# Patient Record
Sex: FEMALE | Race: WHITE | NOT HISPANIC OR LATINO | Employment: FULL TIME | URBAN - METROPOLITAN AREA
[De-identification: names, ages, dates, MRNs, and addresses within clinical notes are randomized per-mention and may not be internally consistent; named-entity substitution may affect disease eponyms.]

---

## 2019-08-22 ENCOUNTER — HOSPITAL ENCOUNTER (EMERGENCY)
Facility: HOSPITAL | Age: 64
Discharge: HOME | End: 2019-08-22
Attending: EMERGENCY MEDICINE
Payer: COMMERCIAL

## 2019-08-22 VITALS
RESPIRATION RATE: 18 BRPM | SYSTOLIC BLOOD PRESSURE: 146 MMHG | BODY MASS INDEX: 33.73 KG/M2 | HEIGHT: 66 IN | DIASTOLIC BLOOD PRESSURE: 79 MMHG | OXYGEN SATURATION: 98 % | HEART RATE: 86 BPM | TEMPERATURE: 98 F

## 2019-08-22 DIAGNOSIS — N12 PYELONEPHRITIS: Primary | ICD-10-CM

## 2019-08-22 LAB
ALBUMIN SERPL BCP-MCNC: 4 G/DL (ref 3.5–5.2)
ALP SERPL-CCNC: 99 U/L (ref 55–135)
ALT SERPL W/O P-5'-P-CCNC: 16 U/L (ref 10–44)
ANION GAP SERPL CALC-SCNC: 13 MMOL/L (ref 8–16)
AST SERPL-CCNC: 18 U/L (ref 10–40)
BACTERIA #/AREA URNS HPF: ABNORMAL /HPF
BASOPHILS # BLD AUTO: 0.02 K/UL (ref 0–0.2)
BASOPHILS NFR BLD: 0.2 % (ref 0–1.9)
BILIRUB SERPL-MCNC: 0.7 MG/DL (ref 0.1–1)
BILIRUB UR QL STRIP: NEGATIVE
BUN SERPL-MCNC: 16 MG/DL (ref 8–23)
CALCIUM SERPL-MCNC: 9.7 MG/DL (ref 8.7–10.5)
CHLORIDE SERPL-SCNC: 106 MMOL/L (ref 95–110)
CLARITY UR: CLEAR
CO2 SERPL-SCNC: 21 MMOL/L (ref 23–29)
COLOR UR: YELLOW
CREAT SERPL-MCNC: 0.8 MG/DL (ref 0.5–1.4)
DIFFERENTIAL METHOD: ABNORMAL
EOSINOPHIL # BLD AUTO: 0 K/UL (ref 0–0.5)
EOSINOPHIL NFR BLD: 0.3 % (ref 0–8)
ERYTHROCYTE [DISTWIDTH] IN BLOOD BY AUTOMATED COUNT: 12.6 % (ref 11.5–14.5)
EST. GFR  (AFRICAN AMERICAN): >60 ML/MIN/1.73 M^2
EST. GFR  (NON AFRICAN AMERICAN): >60 ML/MIN/1.73 M^2
GLUCOSE SERPL-MCNC: 98 MG/DL (ref 70–110)
GLUCOSE UR QL STRIP: NEGATIVE
HCT VFR BLD AUTO: 39.5 % (ref 37–48.5)
HGB BLD-MCNC: 13.5 G/DL (ref 12–16)
HGB UR QL STRIP: NEGATIVE
KETONES UR QL STRIP: NEGATIVE
LEUKOCYTE ESTERASE UR QL STRIP: ABNORMAL
LYMPHOCYTES # BLD AUTO: 1 K/UL (ref 1–4.8)
LYMPHOCYTES NFR BLD: 8.2 % (ref 18–48)
MCH RBC QN AUTO: 31.3 PG (ref 27–31)
MCHC RBC AUTO-ENTMCNC: 34.2 G/DL (ref 32–36)
MCV RBC AUTO: 91 FL (ref 82–98)
MICROSCOPIC COMMENT: ABNORMAL
MONOCYTES # BLD AUTO: 1.1 K/UL (ref 0.3–1)
MONOCYTES NFR BLD: 8.7 % (ref 4–15)
NEUTROPHILS # BLD AUTO: 10.4 K/UL (ref 1.8–7.7)
NEUTROPHILS NFR BLD: 82.9 % (ref 38–73)
NITRITE UR QL STRIP: POSITIVE
PH UR STRIP: 8 [PH] (ref 5–8)
PLATELET # BLD AUTO: 225 K/UL (ref 150–350)
PMV BLD AUTO: 10.8 FL (ref 9.2–12.9)
POTASSIUM SERPL-SCNC: 3.9 MMOL/L (ref 3.5–5.1)
PROT SERPL-MCNC: 7.8 G/DL (ref 6–8.4)
PROT UR QL STRIP: NEGATIVE
RBC # BLD AUTO: 4.32 M/UL (ref 4–5.4)
SODIUM SERPL-SCNC: 140 MMOL/L (ref 136–145)
SP GR UR STRIP: 1.01 (ref 1–1.03)
URN SPEC COLLECT METH UR: ABNORMAL
UROBILINOGEN UR STRIP-ACNC: NEGATIVE EU/DL
WBC # BLD AUTO: 12.54 K/UL (ref 3.9–12.7)
WBC #/AREA URNS HPF: 30 /HPF (ref 0–5)

## 2019-08-22 PROCEDURE — 99285 EMERGENCY DEPT VISIT HI MDM: CPT | Mod: 25

## 2019-08-22 PROCEDURE — 87077 CULTURE AEROBIC IDENTIFY: CPT

## 2019-08-22 PROCEDURE — 80053 COMPREHEN METABOLIC PANEL: CPT

## 2019-08-22 PROCEDURE — 96365 THER/PROPH/DIAG IV INF INIT: CPT

## 2019-08-22 PROCEDURE — 87186 SC STD MICRODIL/AGAR DIL: CPT

## 2019-08-22 PROCEDURE — 63600175 PHARM REV CODE 636 W HCPCS: Performed by: EMERGENCY MEDICINE

## 2019-08-22 PROCEDURE — 87086 URINE CULTURE/COLONY COUNT: CPT

## 2019-08-22 PROCEDURE — 85025 COMPLETE CBC W/AUTO DIFF WBC: CPT

## 2019-08-22 PROCEDURE — 87088 URINE BACTERIA CULTURE: CPT

## 2019-08-22 PROCEDURE — 25500020 PHARM REV CODE 255: Performed by: EMERGENCY MEDICINE

## 2019-08-22 PROCEDURE — 81000 URINALYSIS NONAUTO W/SCOPE: CPT

## 2019-08-22 RX ORDER — CEPHALEXIN 500 MG/1
500 CAPSULE ORAL 4 TIMES DAILY
Qty: 28 CAPSULE | Refills: 0 | Status: SHIPPED | OUTPATIENT
Start: 2019-08-22 | End: 2019-08-29

## 2019-08-22 RX ORDER — HYDROCODONE BITARTRATE AND ACETAMINOPHEN 5; 325 MG/1; MG/1
1 TABLET ORAL EVERY 6 HOURS PRN
Qty: 9 TABLET | Refills: 0 | Status: SHIPPED | OUTPATIENT
Start: 2019-08-22 | End: 2020-02-19

## 2019-08-22 RX ADMIN — CEFTRIAXONE 1 G: 1 INJECTION, SOLUTION INTRAVENOUS at 04:08

## 2019-08-22 RX ADMIN — IOHEXOL 100 ML: 350 INJECTION, SOLUTION INTRAVENOUS at 03:08

## 2019-08-22 NOTE — DISCHARGE INSTRUCTIONS
Keflex as prescribed for infection.  Ibuprofen for pain.  Norco for breakthrough pain.  Follow up with her doctor tomorrow for re-evaluation.  Return as needed for any worsening symptoms, problems, questions or concerns.

## 2019-08-22 NOTE — ED PROVIDER NOTES
SCRIBE #1 NOTE: I, Corinne Mack, am scribing for, and in the presence of, Yuri Templeton MD. I have scribed the HPI, ROS, and PEx.     SCRIBE #2 NOTE: I, Jackelyn Brewer am scribing for, and in the presence of,  Marcelo Torre Jr., MD. I have scribed the remaining portions of the note not scribed by Scribe #1.     History      Chief Complaint   Patient presents with    Abdominal Pain     Pt c/o of LLQ abdominal pain and cramping xs 1 day.        Review of patient's allergies indicates:   Allergen Reactions    No known allergies         HPI   HPI    8/22/2019, 1:45 PM   History obtained from the patient      History of Present Illness: Lauren Ballard is a 63 y.o. female patient with PMHx of anxiety and fibromyalgia who presents to the Emergency Department for shooting L lower abd pain that radiates to the navel which onset gradually this morning. Symptoms are intermittent and moderate in severity. No mitigating or exacerbating factors reported. Associated sxs include hot flashes, diaphoresis, chills, and SOB. Patient denies any fever, CP, N/V/D, back pain, neck pain, HA, dizziness, and all other sxs at this time. No prior Tx reported. No further complaints or concerns at this time.         Arrival mode: Personal vehicle    PCP: Fausto Ely MD       Past Medical History:  Past Medical History:   Diagnosis Date    Acid reflux     Allergy     Anxiety     Fibromyalgia 2003    Fibromyalgia     Joint pain        Past Surgical History:  Past Surgical History:   Procedure Laterality Date    ADENOIDECTOMY      HYSTERECTOMY      NASAL SEPTUM SURGERY      TONSILLECTOMY           Family History:  Family History   Problem Relation Age of Onset    Diabetes Mother     Rheum arthritis Mother     Heart failure Father     Rashes / Skin problems Brother     Factor V Leiden deficiency Brother     Melanoma Brother     Hypertension Maternal Grandfather     Rheum arthritis Sister     Fibromyalgia Sister      Factor V Leiden deficiency Sister     Fibromyalgia Sister     Thyroid disease Neg Hx     Stroke Neg Hx     Lupus Neg Hx     Psoriasis Neg Hx     Eczema Neg Hx        Social History:  Social History     Tobacco Use    Smoking status: Never Smoker    Smokeless tobacco: Never Used   Substance and Sexual Activity    Alcohol use: Yes     Alcohol/week: 0.6 - 2.4 oz     Types: 1 - 4 Glasses of wine per week     Frequency: Never     Comment: socially    Drug use: No    Sexual activity: Never       ROS   Review of Systems   Constitutional: Positive for chills and diaphoresis. Negative for fever.        (+) hot flashes   Respiratory: Positive for shortness of breath. Negative for cough.    Cardiovascular: Negative for chest pain and leg swelling.   Gastrointestinal: Positive for abdominal pain. Negative for diarrhea, nausea and vomiting.   Musculoskeletal: Negative for back pain, neck pain and neck stiffness.   Skin: Negative for rash and wound.   Neurological: Negative for dizziness, light-headedness, numbness and headaches.   All other systems reviewed and are negative.    Physical Exam      Initial Vitals [08/22/19 1303]   BP Pulse Resp Temp SpO2   (!) 158/87 94 16 98.3 °F (36.8 °C) 97 %      MAP       --          Physical Exam  Nursing Notes and Vital Signs Reviewed.  Constitutional: Patient is in no acute distress. Well-developed and well-nourished.  Head: Atraumatic. Normocephalic.  Eyes: PERRL. EOM intact. Conjunctivae are not pale. No scleral icterus.  ENT: Mucous membranes are moist. Oropharynx is clear and symmetric.    Neck: Supple. Full ROM. No lymphadenopathy.  Cardiovascular: Regular rate. Regular rhythm. No murmurs, rubs, or gallops. Distal pulses are 2+ and symmetric.  Pulmonary/Chest: No respiratory distress. Clear to auscultation bilaterally. No wheezing or rales.  Abdominal: Soft and non-distended.  There is mild LLQ tenderness.  No rebound, guarding, or rigidity. Good bowel  "sounds.  Musculoskeletal: Moves all extremities. No obvious deformities. No edema.   Skin: Warm and dry.  Neurological:  Alert, awake, and appropriate.  Normal speech.  No acute focal neurological deficits are appreciated.  Psychiatric: Anxious. Good eye contact. Appropriate in content.    ED Course    Procedures  ED Vital Signs:  Vitals:    08/22/19 1303   BP: (!) 158/87   Pulse: 94   Resp: 16   Temp: 98.3 °F (36.8 °C)   TempSrc: Oral   SpO2: 97%   Height: 5' 6" (1.676 m)       Abnormal Lab Results:  Labs Reviewed   CBC W/ AUTO DIFFERENTIAL - Abnormal; Notable for the following components:       Result Value    Mean Corpuscular Hemoglobin 31.3 (*)     Gran # (ANC) 10.4 (*)     Mono # 1.1 (*)     Gran% 82.9 (*)     Lymph% 8.2 (*)     All other components within normal limits   COMPREHENSIVE METABOLIC PANEL - Abnormal; Notable for the following components:    CO2 21 (*)     All other components within normal limits   URINALYSIS, REFLEX TO URINE CULTURE - Abnormal; Notable for the following components:    Nitrite, UA Positive (*)     Leukocytes, UA 2+ (*)     All other components within normal limits    Narrative:     In and Out Cath as needed it patient unable to void  Preferred Collection Type->Urine, Clean Catch   URINALYSIS MICROSCOPIC - Abnormal; Notable for the following components:    WBC, UA 30 (*)     Bacteria Many (*)     All other components within normal limits    Narrative:     In and Out Cath as needed it patient unable to void  Preferred Collection Type->Urine, Clean Catch   CULTURE, URINE        All Lab Results:  Results for orders placed or performed during the hospital encounter of 08/22/19   CBC auto differential   Result Value Ref Range    WBC 12.54 3.90 - 12.70 K/uL    RBC 4.32 4.00 - 5.40 M/uL    Hemoglobin 13.5 12.0 - 16.0 g/dL    Hematocrit 39.5 37.0 - 48.5 %    Mean Corpuscular Volume 91 82 - 98 fL    Mean Corpuscular Hemoglobin 31.3 (H) 27.0 - 31.0 pg    Mean Corpuscular Hemoglobin Conc 34.2 " 32.0 - 36.0 g/dL    RDW 12.6 11.5 - 14.5 %    Platelets 225 150 - 350 K/uL    MPV 10.8 9.2 - 12.9 fL    Gran # (ANC) 10.4 (H) 1.8 - 7.7 K/uL    Lymph # 1.0 1.0 - 4.8 K/uL    Mono # 1.1 (H) 0.3 - 1.0 K/uL    Eos # 0.0 0.0 - 0.5 K/uL    Baso # 0.02 0.00 - 0.20 K/uL    Gran% 82.9 (H) 38.0 - 73.0 %    Lymph% 8.2 (L) 18.0 - 48.0 %    Mono% 8.7 4.0 - 15.0 %    Eosinophil% 0.3 0.0 - 8.0 %    Basophil% 0.2 0.0 - 1.9 %    Differential Method Automated    Comprehensive metabolic panel   Result Value Ref Range    Sodium 140 136 - 145 mmol/L    Potassium 3.9 3.5 - 5.1 mmol/L    Chloride 106 95 - 110 mmol/L    CO2 21 (L) 23 - 29 mmol/L    Glucose 98 70 - 110 mg/dL    BUN, Bld 16 8 - 23 mg/dL    Creatinine 0.8 0.5 - 1.4 mg/dL    Calcium 9.7 8.7 - 10.5 mg/dL    Total Protein 7.8 6.0 - 8.4 g/dL    Albumin 4.0 3.5 - 5.2 g/dL    Total Bilirubin 0.7 0.1 - 1.0 mg/dL    Alkaline Phosphatase 99 55 - 135 U/L    AST 18 10 - 40 U/L    ALT 16 10 - 44 U/L    Anion Gap 13 8 - 16 mmol/L    eGFR if African American >60 >60 mL/min/1.73 m^2    eGFR if non African American >60 >60 mL/min/1.73 m^2   Urinalysis, Reflex to Urine Culture Urine, Clean Catch   Result Value Ref Range    Specimen UA Urine, Clean Catch     Color, UA Yellow Yellow, Straw, Elenita    Appearance, UA Clear Clear    pH, UA 8.0 5.0 - 8.0    Specific Gravity, UA 1.010 1.005 - 1.030    Protein, UA Negative Negative    Glucose, UA Negative Negative    Ketones, UA Negative Negative    Bilirubin (UA) Negative Negative    Occult Blood UA Negative Negative    Nitrite, UA Positive (A) Negative    Urobilinogen, UA Negative <2.0 EU/dL    Leukocytes, UA 2+ (A) Negative   Urinalysis Microscopic   Result Value Ref Range    WBC, UA 30 (H) 0 - 5 /hpf    Bacteria Many (A) None-Occ /hpf    Microscopic Comment SEE COMMENT        Imaging Results:  Imaging Results          CT Abdomen Pelvis With Contrast (Final result)  Result time 08/22/19 16:23:43    Final result by Shane Malave III, MD  (08/22/19 16:23:43)                 Impression:      1.  Patient has had an interval cholecystectomy.    2.  Suspect an element of fatty change in the liver.    3.  Diverticulosis without definite evidence of diverticulitis.    4.  Mildly prominent left renal collecting system with prominence of the left ureter and mild enhancement along the wall.  Infection?  Stone not totally excluded, see discussion above.  Follow-up recommended.    All CT scans at this facility are performed  using dose modulation techniques as appropriate to performed exam including the following:  automated exposure control; adjustment of mA and/or kV according to the patients size (this includes techniques or standardized protocols for targeted exams where dose is matched to indication/reason for exam: i.e. extremities or head);  iterative reconstruction technique.      Electronically signed by: Shane Malave  Date:    08/22/2019  Time:    16:23             Narrative:    EXAMINATION:  CT ABDOMEN PELVIS WITH CONTRAST    CLINICAL HISTORY:  LLQ pain, suspect diverticulitis;    TECHNIQUE:  Axial CT imaging was performed through the abdomen and pelvis with  75cc  of intravenous contrast. Multiplanar reformats were performed and interpreted.    COMPARISON:  Two thousand twelve    FINDINGS:  Multiplanar imaging obtained through the abdomen with contrast.  Lung bases show no acute abnormality.  No suspicious mass or nodule.  There is no free intraperitoneal air or bowel obstruction.  Bony window shows no gross acute abnormality.    Within the abdomen the liver and spleen show no gross enlargement or focal mass.  Cannot exclude an element of fatty change in the liver.  Surgical clips noted in the gallbladder fossa.  There is no adrenal or pancreatic mass or enlargement.  No solid renal mass.  No definite obstruction although the left renal collecting system is at least borderline prominent as well as the ureter.  There is enhancement suggested  along the wall of the ureter and in the pelvis along the bladder there is a 1 mm calcification posterolaterally on the left.  I suspect this is adjacent to but not within the distal ureter but I cannot exclude a distal ureteral stone.    There is diverticulosis, greatest distally.  No abnormality in the right lower quadrant to suggest acute appendicitis.  Bladder is unremarkable.                                        The Emergency Provider reviewed the vital signs and test results, which are outlined above.    ED Discussion     4:00 PM: Dr. Templeton transfers care of pt to Dr. Torre, pending imaging results.    4:54 PM: Dr. Torre assessed pt at this time. Discussed with pt all pertinent ED information and results. Discussed pt dx and plan of tx. Gave pt all f/u and return to the ED instructions. All questions and concerns were addressed at this time. Pt expresses understanding of information and instructions, and is comfortable with plan to discharge. Pt is stable for discharge.    I discussed with patient and/or family/caretaker that evaluation in the ED does not suggest any emergent or life threatening medical conditions requiring immediate intervention beyond what was provided in the ED, and I believe patient is safe for discharge.  Regardless, an unremarkable evaluation in the ED does not preclude the development or presence of a serious of life threatening condition. As such, patient was instructed to return immediately for any worsening or change in current symptoms.    5:02 PM  Patient is stable nontoxic.  Patient clinically has urinary tract infection mild stranding around the kidney which would be indicative of pyelo.  She is safe for discharge in my opinion.  She is nontoxic with normal vital signs and afebrile.  She is able take antibiotics by mouth and has good follow-up.  I discussed with her all findings well as plan of care.  She verbalized understanding agreement seems reliable she has a follow-up  with primary care doctor tomorrow for re-evaluation.    ED Medication(s):  Medications   cefTRIAXone (ROCEPHIN) 1 g in dextrose 5 % 50 mL IVPB (1 g Intravenous New Bag 8/22/19 1606)   iohexol (OMNIPAQUE 350) injection 100 mL (100 mLs Intravenous Given 8/22/19 1559)          Medication List      ASK your doctor about these medications    ALPRAZolam 1 MG tablet  Commonly known as:  XANAX  Take 1 tablet (1 mg total) by mouth nightly as needed.     pantoprazole 40 MG tablet  Commonly known as:  PROTONIX  Take 1 tablet (40 mg total) by mouth once daily.                  Medical Decision Making      ED Course as of Aug 22 1655   Thu Aug 22, 2019   1450 Microscopic Comment: SEE COMMENT [BA]      ED Course User Index  [BA] Yuri Templeton MD       Medical Decision Making:   Clinical Tests:   Lab Tests: Ordered and Reviewed  Radiological Study: Ordered and Reviewed           Scribe Attestation:   Scribe #1: I performed the above scribed service and the documentation accurately describes the services I performed. I attest to the accuracy of the note 08/22/2019.    Attending:   Physician Attestation Statement for Scribe #1: I, Yuri Templeton MD, personally performed the services described in this documentation, as scribed by Corinne Mack, in my presence, and it is both accurate and complete.       Scribe Attestation:   Scribe #2: I performed the above scribed service and the documentation accurately describes the services I performed. I attest to the accuracy of the note.    Attending Attestation:           Physician Attestation for Scribe:    Physician Attestation Statement for Scribe #2: I, Marcelo Torre Jr., MD, reviewed documentation, as scribed by Jackelyn Brewer in my presence, and it is both accurate and complete. I also acknowledge and confirm the content of the note done by Scribe #1.          Clinical Impression     No diagnosis found.    Disposition:   Disposition: Discharged  Condition: Stable           Marcelo NEFF  Nicho Odell MD  08/22/19 5955

## 2019-08-25 LAB — BACTERIA UR CULT: ABNORMAL

## 2019-09-18 PROBLEM — N81.89 PELVIC RELAXATION: Status: ACTIVE | Noted: 2017-01-30

## 2019-09-18 PROBLEM — F52.0 LACK OF SEXUAL INTEREST: Status: ACTIVE | Noted: 2017-01-30

## 2019-09-18 PROBLEM — N81.11 MIDLINE CYSTOCELE: Status: ACTIVE | Noted: 2018-04-13

## 2019-09-18 PROBLEM — N39.3 STRESS INCONTINENCE IN FEMALE: Status: ACTIVE | Noted: 2017-01-30

## 2019-09-18 PROBLEM — K43.9 EPIGASTRIC HERNIA: Status: ACTIVE | Noted: 2018-01-30

## 2019-10-02 ENCOUNTER — OFFICE VISIT (OUTPATIENT)
Dept: INTERNAL MEDICINE | Facility: CLINIC | Age: 64
End: 2019-10-02
Attending: FAMILY MEDICINE
Payer: COMMERCIAL

## 2019-10-02 VITALS
SYSTOLIC BLOOD PRESSURE: 138 MMHG | HEIGHT: 66 IN | OXYGEN SATURATION: 97 % | DIASTOLIC BLOOD PRESSURE: 82 MMHG | WEIGHT: 212.94 LBS | HEART RATE: 83 BPM | BODY MASS INDEX: 34.22 KG/M2

## 2019-10-02 DIAGNOSIS — K43.9 EPIGASTRIC HERNIA: ICD-10-CM

## 2019-10-02 DIAGNOSIS — Z00.00 PREVENTATIVE HEALTH CARE: Primary | ICD-10-CM

## 2019-10-02 DIAGNOSIS — K21.9 GASTROESOPHAGEAL REFLUX DISEASE WITHOUT ESOPHAGITIS: ICD-10-CM

## 2019-10-02 DIAGNOSIS — N39.3 STRESS INCONTINENCE IN FEMALE: ICD-10-CM

## 2019-10-02 DIAGNOSIS — F41.9 ANXIETY: ICD-10-CM

## 2019-10-02 PROCEDURE — 99999 PR PBB SHADOW E&M-EST. PATIENT-LVL III: ICD-10-PCS | Mod: PBBFAC,,, | Performed by: FAMILY MEDICINE

## 2019-10-02 PROCEDURE — 99387 INIT PM E/M NEW PAT 65+ YRS: CPT | Mod: S$GLB,,, | Performed by: FAMILY MEDICINE

## 2019-10-02 PROCEDURE — 99999 PR PBB SHADOW E&M-EST. PATIENT-LVL III: CPT | Mod: PBBFAC,,, | Performed by: FAMILY MEDICINE

## 2019-10-02 PROCEDURE — 99387 PR PREVENTIVE VISIT,NEW,65 & OVER: ICD-10-PCS | Mod: S$GLB,,, | Performed by: FAMILY MEDICINE

## 2019-10-02 RX ORDER — CLONIDINE HYDROCHLORIDE 0.2 MG/1
0.2 TABLET ORAL 2 TIMES DAILY
Qty: 60 TABLET | Refills: 11 | Status: SHIPPED | OUTPATIENT
Start: 2019-10-02 | End: 2020-10-01

## 2019-10-02 RX ORDER — BUTALBITAL, ASPIRIN, AND CAFFEINE 325; 50; 40 MG/1; MG/1; MG/1
CAPSULE ORAL
COMMUNITY
Start: 2018-02-01

## 2019-10-02 RX ORDER — LIDOCAINE 50 MG/G
1 PATCH TOPICAL DAILY
Qty: 90 PATCH | Refills: 3 | Status: SHIPPED | OUTPATIENT
Start: 2019-10-02 | End: 2020-02-19 | Stop reason: SDUPTHER

## 2019-10-02 NOTE — PROGRESS NOTES
"CHIEF COMPLAINT:  Reestablish care in a patient with anxiety, and fibromyalgia    HISTORY OF PRESENT ILLNESS: The patient is a 64 year-old white female.  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  It has been about 5 years since she was seen.  In that time she has had significant problems with stress incontinence.  She would like to have her problem addressed surgically.  She is scheduled to see the urogynecology department soon.    She has numerous hernias that she would like addressed.    She is on chronic Xanax due to the fact that she has been having anxiety.     She has pulmonary nodules that need CT followup.  She has a fatty liver.    REVIEW OF SYSTEMS:  GENERAL: No fever, chills, fatigability or weight loss.  SKIN: No rashes, itching or changes in color or texture of skin.  HEAD: No headaches or recent head trauma.  EYES: Visual acuity fine. No photophobia, ocular pain or diplopia.  EARS: Denies ear pain, discharge or vertigo.  NOSE: No loss of smell, no epistaxis or postnasal drip.  MOUTH & THROAT: No hoarseness or change in voice. No excessive gum bleeding.  NODES: Denies swollen glands.  CHEST: Denies TYSON, cyanosis, wheezing, cough and sputum production.  CARDIOVASCULAR: Denies chest pain, PND, orthopnea or reduced exercise tolerance.  ABDOMEN: Appetite fine. No weight loss. Denies diarrhea, hematemesis or blood in stool.  URINARY: No flank pain, dysuria or hematuria.  PERIPHERAL VASCULAR: No claudication or cyanosis.  MUSCULOSKELETAL: No joint stiffness or swelling. Denies back pain.  Except as mentioned above.  NEUROLOGIC: No history of seizures, paralysis, alteration of gait or coordination.    SOCIAL HISTORY: The patient does not smoke.  The patient consumes alcohol socially.  The patient is single.    PHYSICAL EXAMINATION:     Blood pressure 138/82, pulse 83, height 5' 6" (1.676 m), weight 96.6 kg (212 lb 15.4 oz), SpO2 97 %.    APPEARANCE: Well nourished, well developed, in " no acute distress.    HEAD: Normocephalic, atraumatic.  EYES: PERRL. EOMI.  Conjunctivae without injection and  anicteric  EARS: TM's intact. Light reflex normal. No retraction or perforation.    NOSE: Mucosa pink. Airway clear.  MOUTH & THROAT: No tonsillar enlargement. No pharyngeal erythema or exudate. No stridor.  NECK: Supple.   NODES: No cervical, axillary or inguinal lymph node enlargement.  CHEST: Lungs clear to auscultation.  No retractions are noted.  No rales or rhonchi are present.  CARDIOVASCULAR: Normal S1, S2. No rubs, murmurs or gallops.  ABDOMEN: Bowel sounds normal. Not distended. Soft. No tenderness or masses.  No ascites is noted.  MUSCULOSKELETAL:  There is no clubbing, cyanosis, or edema of the extremities x4.  There is full range of motion of the lumbar spine.  There is full range of motion of the extremities x4.  There is no deformity noted.    NEUROLOGIC:       Normal speech development.      Hearing normal.      Normal gait.      Motor and sensory exams grossly normal.  PSYCHIATRIC: Patient is alert and oriented x3.  Thought processes are all normal.  There is no homicidality.  There is no suicidality.  There is no evidence of psychosis.    LABORATORY/RADIOLOGY:   Chart reviewed.      ASSESSMENT:   Annual  Stress incontinence  Abdominal wall hernias  Pulmonary nodules  Anxiety  Fibromyalgia    PLAN:  The patient is set up with Urogynecology and General surgery  Medications refilled  Return to clinic preop      Answers for HPI/ROS submitted by the patient on 10/1/2019   activity change: No  unexpected weight change: No  neck pain: Yes  hearing loss: Yes  rhinorrhea: No  trouble swallowing: Yes  eye discharge: No  visual disturbance: No  chest tightness: No  wheezing: No  chest pain: No  palpitations: No  blood in stool: No  constipation: No  vomiting: No  diarrhea: No  polydipsia: No  polyuria: Yes  difficulty urinating: No  hematuria: No  menstrual problem: No  dysuria: No  joint swelling:  Yes  arthralgias: Yes  headaches: Yes  weakness: No  confusion: No  dysphoric mood: No

## 2019-10-03 ENCOUNTER — IMMUNIZATION (OUTPATIENT)
Dept: PHARMACY | Facility: CLINIC | Age: 64
End: 2019-10-03
Payer: COMMERCIAL

## 2019-10-03 ENCOUNTER — TELEPHONE (OUTPATIENT)
Dept: INTERNAL MEDICINE | Facility: CLINIC | Age: 64
End: 2019-10-03

## 2019-10-03 ENCOUNTER — IMMUNIZATION (OUTPATIENT)
Dept: PHARMACY | Facility: CLINIC | Age: 64
End: 2019-10-03

## 2019-10-03 DIAGNOSIS — K57.92 DIVERTICULITIS: Primary | ICD-10-CM

## 2019-10-03 NOTE — TELEPHONE ENCOUNTER
Advised patient she would need to see two different doctors for upper GI scope and hernia. She was fine with this. The patient verbalized understanding and had no further questions or concerns.

## 2019-10-03 NOTE — TELEPHONE ENCOUNTER
----- Message from Tete Starkey sent at 10/3/2019  1:31 PM CDT -----  Contact: Self  Name of Who is Calling: KENTON SOOD [[id number]]      What is the request in detail: pt would like to know if she could be referred to a GI. Pt states that she would like to speak with staff in regards to finding a doctor as she is not familiar with the area. Please contact to further discuss and advise.      Can the clinic reply by [facility]:       What [facility]: [phone]

## 2019-10-09 ENCOUNTER — TELEPHONE (OUTPATIENT)
Dept: UROGYNECOLOGY | Facility: CLINIC | Age: 64
End: 2019-10-09

## 2019-10-09 NOTE — TELEPHONE ENCOUNTER
Spoke to pt, she has consult appointment schedule 11/8/2019, pt is self referral. Pt is requesting to have surgery (bladder lift) before 12/20/2019 because her deductable has been met. Pt also states she can not be off work the week of Thanksgiving. She is wanting to know if this is possible. Please advise.

## 2019-10-09 NOTE — TELEPHONE ENCOUNTER
----- Message from Becca Montaño sent at 10/9/2019  2:58 PM CDT -----  Contact: KENTON SOOD [[id number]]   Name of Who is Calling: KENTON SOOD [[id number]]    What is the request in detail:  Patient request call back in reference to questions about surgery... patient want to try for surgery before the end of the year  Please contact to further discuss and advise      Can the clinic reply by [facility]: no     What Number to Call Back if not in [facility]:

## 2019-10-10 ENCOUNTER — TELEPHONE (OUTPATIENT)
Dept: UROGYNECOLOGY | Facility: CLINIC | Age: 64
End: 2019-10-10

## 2019-10-10 NOTE — TELEPHONE ENCOUNTER
Spoke to pt, she was offered a sooner appointment to discuss surgical options. Pt consents to 10/24/2019 at 2p date.

## 2019-10-10 NOTE — TELEPHONE ENCOUNTER
----- Message from Huber Maciel MD sent at 10/9/2019  6:27 PM CDT -----  Can we get her an earlier consult appt?  That way, I can see what's going on and try to get her surgery scheduled for before 12/20. Thanks!  ----- Message -----  From: Richy Acuña MA  Sent: 10/9/2019   3:19 PM CDT  To: Sheree Chamberlain NP, Huber Maciel MD    Spoke to pt, she has consult appointment schedule 11/8/2019, pt is self referral. Pt is requesting to have surgery (bladder lift) before 12/20/2019 because her deductable has been met. Pt also states she can not be off work the week of Thanksgiving. She is wanting to know if this is possible. Please advise

## 2019-10-14 ENCOUNTER — TELEPHONE (OUTPATIENT)
Dept: INTERNAL MEDICINE | Facility: CLINIC | Age: 64
End: 2019-10-14

## 2019-10-14 NOTE — TELEPHONE ENCOUNTER
----- Message from Gerry Tellez RN sent at 10/13/2019  3:11 PM CDT -----  Regarding: Referral to [facility]   Our office received a referral for an Epigastric Hernia. Would this be better evaluated in the Gastro Dept?

## 2019-10-15 ENCOUNTER — TELEPHONE (OUTPATIENT)
Dept: GASTROENTEROLOGY | Facility: CLINIC | Age: 64
End: 2019-10-15

## 2019-10-15 ENCOUNTER — TELEPHONE (OUTPATIENT)
Dept: SURGERY | Facility: CLINIC | Age: 64
End: 2019-10-15

## 2019-10-15 NOTE — TELEPHONE ENCOUNTER
Spoke with the pt to offer her an appt with Dr. Briggs. She would like to wait until after she have her upper and lower GI tests done. She will call back to schedule.

## 2019-10-15 NOTE — TELEPHONE ENCOUNTER
----- Message from Gerry Tellez RN sent at 10/15/2019 12:20 PM CDT -----  Regarding: RE: Referral to [facility]   Thanks Susan. I didn't see that. I'll contact the pt.     ----- Message -----  From: Susan Yang MA  Sent: 10/15/2019  12:13 PM CDT  To: Gerry Tellez RN  Subject: FW: Referral to [facility]                               ----- Message -----  From: Sejal Moore RN  Sent: 10/15/2019  11:57 AM CDT  To: Susan Yang MA  Subject: FW: Referral to [facility]                            Internal medicine documented abdominal wall hernias. This should be referred to surgery.  ----- Message -----  From: Susan Yang MA  Sent: 10/15/2019  11:47 AM CDT  To: Sejal Moore RN  Subject: FW: Referral to [facility]                               ----- Message -----  From: Gerry Tellez RN  Sent: 10/13/2019   3:11 PM CDT  To: [facility] Gastro Clinical Staff, #  Subject: Referral to [facility]                               Our office received a referral for an Epigastric Hernia. Would this be better evaluated in the Gastro Dept?

## 2019-10-24 ENCOUNTER — OFFICE VISIT (OUTPATIENT)
Dept: UROGYNECOLOGY | Facility: CLINIC | Age: 64
End: 2019-10-24
Payer: COMMERCIAL

## 2019-10-24 VITALS
BODY MASS INDEX: 34.44 KG/M2 | HEIGHT: 66 IN | SYSTOLIC BLOOD PRESSURE: 160 MMHG | WEIGHT: 214.31 LBS | DIASTOLIC BLOOD PRESSURE: 80 MMHG

## 2019-10-24 DIAGNOSIS — R10.2 PELVIC PAIN IN FEMALE: ICD-10-CM

## 2019-10-24 DIAGNOSIS — M79.7 FIBROMYALGIA: ICD-10-CM

## 2019-10-24 DIAGNOSIS — N81.6 RECTOCELE, FEMALE: ICD-10-CM

## 2019-10-24 DIAGNOSIS — N39.46 URINARY INCONTINENCE, MIXED: Primary | ICD-10-CM

## 2019-10-24 DIAGNOSIS — R19.8 IRREGULAR BOWEL HABITS: ICD-10-CM

## 2019-10-24 DIAGNOSIS — N95.2 VAGINAL ATROPHY: ICD-10-CM

## 2019-10-24 DIAGNOSIS — N81.11 MIDLINE CYSTOCELE: ICD-10-CM

## 2019-10-24 DIAGNOSIS — R15.2 RECTAL URGENCY: ICD-10-CM

## 2019-10-24 PROCEDURE — 3008F PR BODY MASS INDEX (BMI) DOCUMENTED: ICD-10-PCS | Mod: CPTII,S$GLB,, | Performed by: OBSTETRICS & GYNECOLOGY

## 2019-10-24 PROCEDURE — 1101F PT FALLS ASSESS-DOCD LE1/YR: CPT | Mod: CPTII,S$GLB,, | Performed by: OBSTETRICS & GYNECOLOGY

## 2019-10-24 PROCEDURE — 3008F BODY MASS INDEX DOCD: CPT | Mod: CPTII,S$GLB,, | Performed by: OBSTETRICS & GYNECOLOGY

## 2019-10-24 PROCEDURE — 51701 PR INSERTION OF NON-INDWELLING BLADDER CATHETERIZATION FOR RESIDUAL UR: ICD-10-PCS | Mod: S$GLB,,, | Performed by: OBSTETRICS & GYNECOLOGY

## 2019-10-24 PROCEDURE — 1101F PR PT FALLS ASSESS DOC 0-1 FALLS W/OUT INJ PAST YR: ICD-10-PCS | Mod: CPTII,S$GLB,, | Performed by: OBSTETRICS & GYNECOLOGY

## 2019-10-24 PROCEDURE — 99999 PR PBB SHADOW E&M-EST. PATIENT-LVL III: ICD-10-PCS | Mod: PBBFAC,,, | Performed by: OBSTETRICS & GYNECOLOGY

## 2019-10-24 PROCEDURE — 87086 URINE CULTURE/COLONY COUNT: CPT

## 2019-10-24 PROCEDURE — 99999 PR PBB SHADOW E&M-EST. PATIENT-LVL III: CPT | Mod: PBBFAC,,, | Performed by: OBSTETRICS & GYNECOLOGY

## 2019-10-24 PROCEDURE — 51701 INSERT BLADDER CATHETER: CPT | Mod: S$GLB,,, | Performed by: OBSTETRICS & GYNECOLOGY

## 2019-10-24 PROCEDURE — 99205 PR OFFICE/OUTPT VISIT, NEW, LEVL V, 60-74 MIN: ICD-10-PCS | Mod: 25,S$GLB,, | Performed by: OBSTETRICS & GYNECOLOGY

## 2019-10-24 PROCEDURE — 99205 OFFICE O/P NEW HI 60 MIN: CPT | Mod: 25,S$GLB,, | Performed by: OBSTETRICS & GYNECOLOGY

## 2019-10-24 RX ORDER — TIZANIDINE 4 MG/1
4 TABLET ORAL EVERY 6 HOURS PRN
COMMUNITY
End: 2020-03-22 | Stop reason: SDUPTHER

## 2019-10-24 RX ORDER — ESTRADIOL 0.1 MG/G
CREAM VAGINAL
Qty: 42.5 G | Refills: 11 | Status: SHIPPED | OUTPATIENT
Start: 2019-10-24

## 2019-10-24 NOTE — PATIENT INSTRUCTIONS
Bladder Irritants  Certain foods and drinks have been associated with worsening symptoms of urinary frequency, urgency, urge incontinence, or bladder pain. If you suffer from any of these conditions, you may wish to try eliminating one or more of these foods from your diet and see if your symptoms improve. If bladder symptoms are related to dietary factors, strict adherence to a diet thateliminates the food should bring marked relief in 10 days. Once you are feeling better, you can begin to add foods back into your diet, one at a time. If symptoms return, you will be able to identify the irritant. As you add foods back to your diet it is very important that you drink significant amounts of water.    -----------------------------------------------------------------------------------------------  List of Common Bladder Irritants*  Alcoholic beverages  Apples and apple juice  Cantaloupe  Carbonated beverages  Chili and spicy foods  Chocolate  Citrus fruit  Coffee (including decaffeinated)  Cranberries and cranberry juice  Grapes  Guava  Milk Products: milk, cheese, cottage cheese, yogurt, ice cream  Peaches  Pineapple  Plums  Strawberries  Sugar especially artificial sweeteners, saccharin, aspartame, corn sweeteners, honey, fructose, sucrose, lactose  Tea  Tomatoes and tomato juice  Vitamin B complex  Vinegar  *Most people are not sensitive to ALL of these products; your goal is to find the foods that make YOUR symptoms worse.  ---------------------------------------------------------------------------------------------------    Low-acid fruit substitutions include apricots, papaya, pears and watermelon. Coffee drinkers can drink Kava or other lowacid instant drinks. Tea drinkers can substitute non-citrus herbal and sun brewed teas. Calcium carbonate co-buffered with calcium ascorbate can be substituted for Vitamin C. Prelief is a dietary supplement that works as an acid blocker for the bladder.    Where to get more  information:        Overcoming Bladder Disorders by Kayla Whittaker and Razia Morillo, 1990        You Dont Have to Live with Cystitis! By Gemini Coelho, 1988  · http://www.urologymanagement.org/oab    -----------------------------------------------------------------------------------------------    1)  Stage 2  Cystocele/rectocele:  --Discussed pathophysiology  --may be contributing to vaginal/pelvic pressure, but other things may be contributing, too  --observation/PT vs pessary (won't tolerate until levators less TTP) vs surgery (ant/post repair)   --would wait on surgery until things below addressed    2)  Pelvic pressure/discomfort:  --consider contribution of mild prolapse if not improved with addressing other contributors  --work on relaxing/then strengthening pelvic floor muscles: start PT.  Call to make appt:  [facility] (near [place])  Natalya Amezcua, PT, DPT  [phone]  [address] [place] [address]  --work on bowel movements  --work on vaginal dryness  --control fibromyalgia: discuss other meds with MD that may better control    --rheumatology consult   --find talk therapist to work on not internalizing stress; call insurance company    3)  Irregular bowel habits with some loss of control:  --avoid dietary triggers  --work on bulking stool:  Start daily fiber.  Take 1 tsp of fiber powder (psyllium or other sugar-free powder).  Mix in 8 oz of water.  Take x 3-5 days.  Then, increase fiber by 1 tsp every 3-5 days until stool is easy to pass, not too hard/soft, accidents less.  Stop and continue at that dose.   Do not exceed 6 tsps/day.   --hydrate well  --get regular, gentle exercise  --follow up with GI for colonsocopy  --rectal tone normal on exam but squeeze week: start PT    4)  Vaginal atrophy (dryness):  Use 0.5 gram of estrogen cream in vagina nightly x 2 weeks, then twice a week thereafter.   --may also help general vaginal discomfort  --may  help reduce UTIs    5)  Mixed urinary incontinence, urge < stress:   --urine C&S   --Empty bladder every 3 hours.  Empty well: wait a minute, lean forward on toilet.    --Avoid dietary irritants (see sheet).  Keep diary x 3-5 days to determine your irritants.  --start pelvic floor PT  --URGE: consider medication in the future.  Takes 2-4 weeks to see if will have effect.  For dry mouth: get sour, sugar free lozenge or gum.    --STRESS:  Pessary vs. Sling.     6)  Well-woman:  Pap test: s/p hysterectomy.  History of abnormal paps: No.  History of STIs:  No  Mammogram: Date of last: 2017.  Result: Normal.  Declines mammogram due to tenderness.  Talk with PCP about scheduling.   Colonoscopy: Date of last: 2009.  Result:  Normal.  Repeat due: 2019-going tomorrow, diverticulosis.    DEXA: N/A.  Start at 63 yo.     7)  RTC 3-4 months with NP.
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
## 2019-10-25 LAB — BACTERIA UR CULT: NO GROWTH

## 2019-10-27 PROBLEM — N39.46 URINARY INCONTINENCE, MIXED: Status: ACTIVE | Noted: 2019-10-27

## 2019-10-27 PROBLEM — N81.6 RECTOCELE, FEMALE: Status: ACTIVE | Noted: 2019-10-27

## 2019-10-27 PROBLEM — N95.2 VAGINAL ATROPHY: Status: ACTIVE | Noted: 2019-10-27

## 2019-10-27 PROBLEM — R19.8 IRREGULAR BOWEL HABITS: Status: ACTIVE | Noted: 2019-10-27

## 2019-10-27 PROBLEM — R10.2 PELVIC PAIN IN FEMALE: Status: ACTIVE | Noted: 2019-10-27

## 2019-10-27 PROBLEM — R15.2 RECTAL URGENCY: Status: ACTIVE | Noted: 2019-10-27

## 2019-10-28 ENCOUNTER — TELEPHONE (OUTPATIENT)
Dept: UROGYNECOLOGY | Facility: CLINIC | Age: 64
End: 2019-10-28

## 2019-10-28 NOTE — TELEPHONE ENCOUNTER
RX, copy of last clinic note and demographic information faxed to  [facility] at [phone].

## 2019-10-31 ENCOUNTER — TELEPHONE (OUTPATIENT)
Dept: ADMINISTRATIVE | Facility: OTHER | Age: 64
End: 2019-10-31

## 2019-10-31 NOTE — TELEPHONE ENCOUNTER
Left voice message for patient to return call to schedule appointment from referral to Rheumatology department.  Lay GARCÍA [phone]

## 2019-11-04 DIAGNOSIS — F41.9 ANXIETY: ICD-10-CM

## 2019-11-04 DIAGNOSIS — M79.7 FIBROMYALGIA: ICD-10-CM

## 2019-11-04 RX ORDER — ALPRAZOLAM 1 MG/1
1 TABLET ORAL NIGHTLY PRN
Qty: 30 TABLET | Refills: 5 | Status: SHIPPED | OUTPATIENT
Start: 2019-11-08 | End: 2020-03-22 | Stop reason: SDUPTHER

## 2019-11-04 NOTE — TELEPHONE ENCOUNTER
----- Message from Bárbara Lugo sent at 11/4/2019  9:19 AM CST -----  Contact: Lauren [phone]  Type: RX Refill Request    Who Called: Lauren     Refill or New Rx: refill     RX Name and Strength:ALPRAZolam (XANAX) 1 MG tablet    Is this a 30 day or 90 day RX:30 day     Preferred Pharmacy with phone number: [facility] [address] [facility] [address] [facility]    Would the patient rather a call back or a response via [facility]? call back    Best Call Back Number:[phone]    Additional Information: (#patient states that she has 5 pills left and will run out of them on Friday. She was just calling ahead of time, due to Dr. Mercedes's schedule. If possible, she would like them refilled on November 8, as the Rx expires on the 9th.#)

## 2019-11-12 ENCOUNTER — TELEPHONE (OUTPATIENT)
Dept: ADMINISTRATIVE | Facility: OTHER | Age: 64
End: 2019-11-12

## 2019-11-18 ENCOUNTER — TELEPHONE (OUTPATIENT)
Dept: INTERNAL MEDICINE | Facility: CLINIC | Age: 64
End: 2019-11-18

## 2019-11-18 NOTE — TELEPHONE ENCOUNTER
Patient doesn't have a voicemail set up  ----- Message from Gloria Walter sent at 11/18/2019 12:15 PM CST -----  Contact: KENTON SOOD   Name of Who is Calling: KENTON SOOD       What is the request in detail: Patient states she can come in on 11/21/2019  At 11:40am      Can the clinic reply by [facility]: no      What Number to Call Back if not in [facility]: [phone]

## 2019-11-21 ENCOUNTER — OFFICE VISIT (OUTPATIENT)
Dept: INTERNAL MEDICINE | Facility: CLINIC | Age: 64
End: 2019-11-21
Attending: FAMILY MEDICINE
Payer: COMMERCIAL

## 2019-11-21 VITALS
SYSTOLIC BLOOD PRESSURE: 138 MMHG | DIASTOLIC BLOOD PRESSURE: 86 MMHG | WEIGHT: 218.94 LBS | OXYGEN SATURATION: 97 % | BODY MASS INDEX: 35.19 KG/M2 | HEIGHT: 66 IN | HEART RATE: 85 BPM

## 2019-11-21 DIAGNOSIS — H53.9 VISION CHANGES: Primary | ICD-10-CM

## 2019-11-21 DIAGNOSIS — M79.7 FIBROMYALGIA: ICD-10-CM

## 2019-11-21 DIAGNOSIS — F41.9 ANXIETY: ICD-10-CM

## 2019-11-21 DIAGNOSIS — K21.9 GASTROESOPHAGEAL REFLUX DISEASE WITHOUT ESOPHAGITIS: ICD-10-CM

## 2019-11-21 PROCEDURE — 99999 PR PBB SHADOW E&M-EST. PATIENT-LVL III: CPT | Mod: PBBFAC,,, | Performed by: FAMILY MEDICINE

## 2019-11-21 PROCEDURE — 3008F BODY MASS INDEX DOCD: CPT | Mod: CPTII,S$GLB,, | Performed by: FAMILY MEDICINE

## 2019-11-21 PROCEDURE — 99214 OFFICE O/P EST MOD 30 MIN: CPT | Mod: S$GLB,,, | Performed by: FAMILY MEDICINE

## 2019-11-21 PROCEDURE — 99214 PR OFFICE/OUTPT VISIT, EST, LEVL IV, 30-39 MIN: ICD-10-PCS | Mod: S$GLB,,, | Performed by: FAMILY MEDICINE

## 2019-11-21 PROCEDURE — 1101F PT FALLS ASSESS-DOCD LE1/YR: CPT | Mod: CPTII,S$GLB,, | Performed by: FAMILY MEDICINE

## 2019-11-21 PROCEDURE — 99999 PR PBB SHADOW E&M-EST. PATIENT-LVL III: ICD-10-PCS | Mod: PBBFAC,,, | Performed by: FAMILY MEDICINE

## 2019-11-21 PROCEDURE — 1101F PR PT FALLS ASSESS DOC 0-1 FALLS W/OUT INJ PAST YR: ICD-10-PCS | Mod: CPTII,S$GLB,, | Performed by: FAMILY MEDICINE

## 2019-11-21 PROCEDURE — 3008F PR BODY MASS INDEX (BMI) DOCUMENTED: ICD-10-PCS | Mod: CPTII,S$GLB,, | Performed by: FAMILY MEDICINE

## 2019-11-21 NOTE — PROGRESS NOTES
"CHIEF COMPLAINT:  Preop in a patient with anxiety, and fibromyalgia    HISTORY OF PRESENT ILLNESS: The patient is a 64 year-old white female.  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  She is scheduled for bilateral blepharoplasties soon.  She has undergone Hyst and souleymane without any problems. She has no risk factors for DVT.  This will be an outpatient surgery.  There is no intention of for general anesthesia.    She has numerous hernias that she would like addressed.    She is on chronic Xanax due to the fact that she has been having anxiety.     She has pulmonary nodules that need CT followup.  She has a fatty liver.    REVIEW OF SYSTEMS:  GENERAL: No fever, chills, fatigability or weight loss.  SKIN: No rashes, itching or changes in color or texture of skin.  HEAD: No headaches or recent head trauma.  EYES: Visual acuity fine. No photophobia, ocular pain or diplopia.  EARS: Denies ear pain, discharge or vertigo.  NOSE: No loss of smell, no epistaxis or postnasal drip.  MOUTH & THROAT: No hoarseness or change in voice. No excessive gum bleeding.  NODES: Denies swollen glands.  CHEST: Denies TYSON, cyanosis, wheezing, cough and sputum production.  CARDIOVASCULAR: Denies chest pain, PND, orthopnea or reduced exercise tolerance.  ABDOMEN: Appetite fine. No weight loss. Denies diarrhea, hematemesis or blood in stool.  URINARY: No flank pain, dysuria or hematuria.  PERIPHERAL VASCULAR: No claudication or cyanosis.  MUSCULOSKELETAL: No joint stiffness or swelling. Denies back pain.  Except as mentioned above.  NEUROLOGIC: No history of seizures, paralysis, alteration of gait or coordination.    SOCIAL HISTORY: The patient does not smoke.  The patient consumes alcohol socially.  The patient is single.    PHYSICAL EXAMINATION:     Blood pressure 138/86, pulse 85, height 5' 6" (1.676 m), weight 99.3 kg (218 lb 14.7 oz), SpO2 97 %.    APPEARANCE: Well nourished, well developed, in no acute distress.  "   HEAD: Normocephalic, atraumatic.  EYES: PERRL. EOMI.  Conjunctivae without injection and  anicteric  EARS: TM's intact. Light reflex normal. No retraction or perforation.    NOSE: Mucosa pink. Airway clear.  MOUTH & THROAT: No tonsillar enlargement. No pharyngeal erythema or exudate. No stridor.  NECK: Supple.   NODES: No cervical, axillary or inguinal lymph node enlargement.  CHEST: Lungs clear to auscultation.  No retractions are noted.  No rales or rhonchi are present.  CARDIOVASCULAR: Normal S1, S2. No rubs, murmurs or gallops.  ABDOMEN: Bowel sounds normal. Not distended. Soft. No tenderness or masses.  No ascites is noted.  MUSCULOSKELETAL:  There is no clubbing, cyanosis, or edema of the extremities x4.  There is full range of motion of the lumbar spine.  There is full range of motion of the extremities x4.  There is no deformity noted.    NEUROLOGIC:       Normal speech development.      Hearing normal.      Normal gait.      Motor and sensory exams grossly normal.  PSYCHIATRIC: Patient is alert and oriented x3.  Thought processes are all normal.  There is no homicidality.  There is no suicidality.  There is no evidence of psychosis.    LABORATORY/RADIOLOGY:   Chart reviewed.      ASSESSMENT:   Preop clearance for blepharoplasty  Stress incontinence  Abdominal wall hernias  Pulmonary nodules  Anxiety  Fibromyalgia    PLAN:  She is medically optimized and cleared for ophthalmological surgery.  She is at low risk of perioperative complications.  Medications refilled  Unfortunately for me, she will be moving to [place] in the next couple of months.  She does not intend to return to [place].

## 2019-12-26 RX ORDER — LIDOCAINE 50 MG/G
1 PATCH TOPICAL DAILY
Qty: 30 PATCH | Refills: 2 | Status: SHIPPED | OUTPATIENT
Start: 2019-12-26 | End: 2019-12-30 | Stop reason: SDUPTHER

## 2019-12-26 NOTE — TELEPHONE ENCOUNTER
----- Message from Becca Montaño sent at 12/26/2019  8:40 AM CST -----  Contact: KENTON SOOD [[id number]]   Name of Who is Calling:     What is the request in detail: patient request call back in reference to  Medication change   From lidocaine  To   LIDODERM  Patient state [facility] - will also send a request    Please contact to further discuss and advise      Can the clinic reply by [facility]: no     What Number to Call Back if not in [facility]:  [phone]

## 2019-12-30 NOTE — TELEPHONE ENCOUNTER
"Contacted patient in regards to lidocaine patch and states she received generic and wants the lidoderm brand. Contacted pharmacy and states prescription from 10/2/19 has to dispense generic.  New prescription needed with notes to pharmacy to state "lidoderm, brand name only." New prescription pended and message sent to provider.   "

## 2019-12-30 NOTE — TELEPHONE ENCOUNTER
----- Message from Bita Charlesmfield sent at 12/30/2019  2:36 PM CST -----  Contact: KENTON SOOD   Name of Who is Calling: KENTON SOOD       What is the request in detail: Would like to speak to staff in regards to sending the lidocaine (LIDODERM) 5 % over to the wrong pharmacy. States she needed to have it mail ordered through [facility] - [address]. Please advise.     Can the clinic reply by [facility]: Yes      What [facility] Back [facility]: [phone]

## 2020-01-02 RX ORDER — LIDOCAINE 50 MG/G
1 PATCH TOPICAL DAILY
Qty: 30 PATCH | Refills: 2 | Status: SHIPPED | OUTPATIENT
Start: 2020-01-02 | End: 2020-02-21 | Stop reason: SDUPTHER

## 2020-02-19 ENCOUNTER — OFFICE VISIT (OUTPATIENT)
Dept: INTERNAL MEDICINE | Facility: CLINIC | Age: 65
End: 2020-02-19
Attending: FAMILY MEDICINE
Payer: COMMERCIAL

## 2020-02-19 VITALS
OXYGEN SATURATION: 98 % | WEIGHT: 219.56 LBS | HEIGHT: 66 IN | DIASTOLIC BLOOD PRESSURE: 80 MMHG | HEART RATE: 76 BPM | SYSTOLIC BLOOD PRESSURE: 128 MMHG | BODY MASS INDEX: 35.29 KG/M2

## 2020-02-19 DIAGNOSIS — K27.9 PEPTIC ULCER DISEASE: ICD-10-CM

## 2020-02-19 DIAGNOSIS — K21.9 GASTROESOPHAGEAL REFLUX DISEASE WITHOUT ESOPHAGITIS: ICD-10-CM

## 2020-02-19 DIAGNOSIS — F41.9 ANXIETY: ICD-10-CM

## 2020-02-19 DIAGNOSIS — Z00.00 PREVENTATIVE HEALTH CARE: ICD-10-CM

## 2020-02-19 DIAGNOSIS — M79.7 FIBROMYALGIA: Primary | ICD-10-CM

## 2020-02-19 PROCEDURE — 99999 PR PBB SHADOW E&M-EST. PATIENT-LVL III: ICD-10-PCS | Mod: PBBFAC,,, | Performed by: FAMILY MEDICINE

## 2020-02-19 PROCEDURE — 99999 PR PBB SHADOW E&M-EST. PATIENT-LVL III: CPT | Mod: PBBFAC,,, | Performed by: FAMILY MEDICINE

## 2020-02-19 PROCEDURE — 99397 PR PREVENTIVE VISIT,EST,65 & OVER: ICD-10-PCS | Mod: S$GLB,,, | Performed by: FAMILY MEDICINE

## 2020-02-19 PROCEDURE — 99397 PER PM REEVAL EST PAT 65+ YR: CPT | Mod: S$GLB,,, | Performed by: FAMILY MEDICINE

## 2020-02-19 RX ORDER — LIDOCAINE 50 MG/G
1 PATCH TOPICAL DAILY
Qty: 90 PATCH | Refills: 3 | Status: SHIPPED | OUTPATIENT
Start: 2020-02-19 | End: 2021-02-18

## 2020-02-19 RX ORDER — TRAMADOL HYDROCHLORIDE 50 MG/1
50 TABLET ORAL EVERY 6 HOURS PRN
Qty: 20 TABLET | Refills: 0 | Status: SHIPPED | OUTPATIENT
Start: 2020-02-19

## 2020-02-19 RX ORDER — PANTOPRAZOLE SODIUM 40 MG/1
40 TABLET, DELAYED RELEASE ORAL DAILY
Qty: 30 TABLET | Refills: 11 | Status: SHIPPED | OUTPATIENT
Start: 2020-02-19 | End: 2021-01-25

## 2020-02-19 NOTE — PROGRESS NOTES
"CHIEF COMPLAINT:  Annual in a patient with anxiety, and fibromyalgia    HISTORY OF PRESENT ILLNESS: The patient is a 64 year-old white female.  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  She has chronic back pain and needs her Lidoderm patches refilled.  She is doing well overall.    She has numerous hernias that she would like addressed.    She is on chronic Xanax due to the fact that she has been having anxiety.     She has pulmonary nodules that need CT followup.  She has a fatty liver.    REVIEW OF SYSTEMS:  GENERAL: No fever, chills, fatigability or weight loss.  SKIN: No rashes, itching or changes in color or texture of skin.  HEAD: No headaches or recent head trauma.  EYES: Visual acuity fine. No photophobia, ocular pain or diplopia.  EARS: Denies ear pain, discharge or vertigo.  NOSE: No loss of smell, no epistaxis or postnasal drip.  MOUTH & THROAT: No hoarseness or change in voice. No excessive gum bleeding.  NODES: Denies swollen glands.  CHEST: Denies TYSON, cyanosis, wheezing, cough and sputum production.  CARDIOVASCULAR: Denies chest pain, PND, orthopnea or reduced exercise tolerance.  ABDOMEN: Appetite fine. No weight loss. Denies diarrhea, hematemesis or blood in stool.  URINARY: No flank pain, dysuria or hematuria.  PERIPHERAL VASCULAR: No claudication or cyanosis.  MUSCULOSKELETAL: No joint stiffness or swelling.  Except as mentioned above.  NEUROLOGIC: No history of seizures, paralysis, alteration of gait or coordination.    SOCIAL HISTORY: The patient does not smoke.  The patient consumes alcohol socially.  The patient is single.    PHYSICAL EXAMINATION:     Blood pressure 128/80, pulse 76, height 5' 6" (1.676 m), weight 99.6 kg (219 lb 9.3 oz), SpO2 98 %.    APPEARANCE: Well nourished, well developed, in no acute distress.    HEAD: Normocephalic, atraumatic.  EYES: PERRL. EOMI.  Conjunctivae without injection and  anicteric  EARS: TM's intact. Light reflex normal. No " retraction or perforation.    NOSE: Mucosa pink. Airway clear.  MOUTH & THROAT: No tonsillar enlargement. No pharyngeal erythema or exudate. No stridor.  NECK: Supple.   NODES: No cervical, axillary or inguinal lymph node enlargement.  CHEST: Lungs clear to auscultation.  No retractions are noted.  No rales or rhonchi are present.  CARDIOVASCULAR: Normal S1, S2. No rubs, murmurs or gallops.  ABDOMEN: Bowel sounds normal. Not distended. Soft. No tenderness or masses.  No ascites is noted.  MUSCULOSKELETAL:  There is no clubbing, cyanosis, or edema of the extremities x4.  There is full range of motion of the lumbar spine.  There is full range of motion of the extremities x4.  There is no deformity noted.    NEUROLOGIC:       Normal speech development.      Hearing normal.      Normal gait.      Motor and sensory exams grossly normal.  PSYCHIATRIC: Patient is alert and oriented x3.  Thought processes are all normal.  There is no homicidality.  There is no suicidality.  There is no evidence of psychosis.    LABORATORY/RADIOLOGY:   Chart reviewed.      ASSESSMENT:   Annual  Stress incontinence  Abdominal wall hernias  Pulmonary nodules  Anxiety  Fibromyalgia    PLAN:  Medications refilled  Unfortunately for me, she will be moving to [place] in the next couple of months.  She does not intend to return to [place].  lidoderm patches refilled

## 2020-02-21 DIAGNOSIS — M79.7 FIBROMYALGIA: Primary | ICD-10-CM

## 2020-02-21 RX ORDER — LIDOCAINE 50 MG/G
1 PATCH TOPICAL DAILY
Qty: 30 PATCH | Refills: 2 | Status: SHIPPED | OUTPATIENT
Start: 2020-02-21

## 2020-03-22 ENCOUNTER — PATIENT MESSAGE (OUTPATIENT)
Dept: INTERNAL MEDICINE | Facility: CLINIC | Age: 65
End: 2020-03-22

## 2020-03-22 DIAGNOSIS — M79.7 FIBROMYALGIA: ICD-10-CM

## 2020-03-22 DIAGNOSIS — F41.9 ANXIETY: ICD-10-CM

## 2020-03-23 RX ORDER — TIZANIDINE 4 MG/1
4 TABLET ORAL EVERY 6 HOURS PRN
Qty: 60 TABLET | Refills: 0 | Status: SHIPPED | OUTPATIENT
Start: 2020-03-23 | End: 2021-05-25

## 2020-03-23 RX ORDER — ALPRAZOLAM 1 MG/1
1 TABLET ORAL NIGHTLY PRN
Qty: 30 TABLET | Refills: 2 | Status: SHIPPED | OUTPATIENT
Start: 2020-03-23 | End: 2020-05-07 | Stop reason: SDUPTHER

## 2020-05-07 DIAGNOSIS — F41.9 ANXIETY: ICD-10-CM

## 2020-05-07 DIAGNOSIS — M79.7 FIBROMYALGIA: ICD-10-CM

## 2020-05-07 RX ORDER — ALPRAZOLAM 1 MG/1
1 TABLET ORAL NIGHTLY PRN
Qty: 30 TABLET | Refills: 2 | Status: SHIPPED | OUTPATIENT
Start: 2020-05-07 | End: 2020-05-08 | Stop reason: SDUPTHER

## 2020-05-07 NOTE — TELEPHONE ENCOUNTER
----- Message from Jannie Millard sent at 5/7/2020  7:47 AM CDT -----  Contact: pt  Can the clinic reply in [facility]: no      Please refill the medication(s) listed below. The patient can be reached at this phone number [phone]) once it is called into the pharmacy.      Medication #1ALPRAZolam (XANAX) 1 MG tablet    Preferred Pharmacy: [facility]  [address] [place] [id number].  [phone]

## 2020-05-08 RX ORDER — ALPRAZOLAM 1 MG/1
1 TABLET ORAL NIGHTLY PRN
Qty: 30 TABLET | Refills: 2 | Status: SHIPPED | OUTPATIENT
Start: 2020-05-08

## 2020-05-08 NOTE — TELEPHONE ENCOUNTER
----- Message from Becca Montaño sent at 5/8/2020 12:36 PM CDT -----  Contact: KENTON SOOD [[id number]]   Name of Who is Calling:     What is the request in detail: patient request office to call  Pharmacy a new prescription for medication    ALPRAZolam (XANAX) 1 MG tablet     /// [facility]   [address] [address]/// Phone: [phone]...   Patient state she has moved   ////patient state prescription was sent to [facility] but can be cancel because she can not transfer    Please contact to further discuss and advise      Can the clinic reply by [facility]: no     What Number to Call Back if not in [facility]:  [phone]

## 2020-05-08 NOTE — TELEPHONE ENCOUNTER
Spoke with patient. Notified her that the medication was sent in. Patient stated she will call back to get refills from Dr. Mercedes in 3 months as she does not wish to go to a doctor's office right now with COVID-19. Advised her not to write off calling to set something up now as many providers are offering virtual visits. She still declined and will call back.

## 2020-05-08 NOTE — TELEPHONE ENCOUNTER
----- Message from Danny Walden sent at 5/8/2020  5:29 PM CDT -----  Contact: Pt  Pt called and needs a refill for ALPRAZolam (XANAX) 1 MG tablet [[id number]] ASAP    Please send this to [facility] at [address] [place] [address] in [place], [place] [id number] [phone]    Pt can be reached at [phone]

## 2020-05-08 NOTE — TELEPHONE ENCOUNTER
----- Message from Rosalee Bowden sent at 5/8/2020  4:20 PM CDT -----  Contact: KENTON SOOD [[id number]]  Name of Who is Calling: KENTON SOOD [[id number]]    What is the request in detail: Patient is calling to follow up on refill request for ALPRAZolam (XANAX) 1 MG tablet. She states she is currently out of the medication and she needs it to be refilled today. Medication was originally sent to the [facility] pharmacy. Please send to [facility]  [address] [place] [id number].  [phone]. Please contact to further discuss and advise      Can the clinic reply by [facility]: no    What Number to Call Back if not in [facility]: [phone]

## 2020-07-17 ENCOUNTER — PATIENT OUTREACH (OUTPATIENT)
Dept: ADMINISTRATIVE | Facility: HOSPITAL | Age: 65
End: 2020-07-17

## 2020-07-24 ENCOUNTER — TELEPHONE (OUTPATIENT)
Dept: INTERNAL MEDICINE | Facility: CLINIC | Age: 65
End: 2020-07-24

## 2020-07-24 NOTE — TELEPHONE ENCOUNTER
Called patient to reschedule her 7/31 appt with Dr Mercedes.  Per patient request change pharmacy to [facility] because she lives in [place].  She rescheduled virtual visit.

## 2020-08-03 ENCOUNTER — OFFICE VISIT (OUTPATIENT)
Dept: INTERNAL MEDICINE | Facility: CLINIC | Age: 65
End: 2020-08-03
Attending: FAMILY MEDICINE
Payer: MEDICARE

## 2020-08-03 DIAGNOSIS — F41.9 ANXIETY: Primary | ICD-10-CM

## 2020-08-03 PROCEDURE — 99499 NO LOS: ICD-10-PCS | Mod: 95,,, | Performed by: FAMILY MEDICINE

## 2020-08-03 PROCEDURE — 99499 UNLISTED E&M SERVICE: CPT | Mod: 95,,, | Performed by: FAMILY MEDICINE

## 2020-08-03 NOTE — PROGRESS NOTES
The patient location is:  [place]  The chief complaint leading to consultation is:  Medication management    Visit type: audiovisual    Face to Face time with patient:  10 min  15 min minutes of total time spent on the encounter, which includes face to face time and non-face to face time preparing to see the patient (eg, review of tests), Obtaining and/or reviewing separately obtained history, Documenting clinical information in the electronic or other health record, Independently interpreting results (not separately reported) and communicating results to the patient/family/caregiver, or Care coordination (not separately reported).         Each patient to whom he or she provides medical services by telemedicine is:  (1) informed of the relationship between the physician and patient and the respective role of any other health care provider with respect to management of the patient; and (2) notified that he or she may decline to receive medical services by telemedicine and may withdraw from such care at any time.    Notes:   Patient is seen in a virtual visit today.  Unfortunately she is not in the state of [place].  I do not have a license to practice medicine in [place].  She will need to get a new doctor in her new home.  There is no charge for today's visit    Answers for HPI/ROS submitted by the patient on 8/2/2020   Sore throat  Chronicity: chronic  Onset: more than 1 year ago  Progression since onset: waxing and waning  Pain worse on: right  Fever: no fever  Pain - numeric: 3/10  abdominal pain: No  congestion: Yes  cough: Yes  diarrhea: No  drooling: No  ear discharge: No  ear pain: Yes  headaches: Yes  hoarse voice: No  neck pain: Yes  plugged ear sensation: Yes  shortness of breath: Yes  stridor: Yes  swollen glands: Yes  trouble swallowing: Yes  vomiting: No  strep: No  mono: No  Treatments tried: gargles  Improvement on treatment: no relief  Pain severity: moderate